# Patient Record
Sex: FEMALE | Race: WHITE | NOT HISPANIC OR LATINO | Employment: UNEMPLOYED | URBAN - NONMETROPOLITAN AREA
[De-identification: names, ages, dates, MRNs, and addresses within clinical notes are randomized per-mention and may not be internally consistent; named-entity substitution may affect disease eponyms.]

---
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
## 2023-09-20 ENCOUNTER — OFFICE VISIT (OUTPATIENT)
Dept: OTOLARYNGOLOGY | Facility: OTHER | Age: 23
End: 2023-09-20
Attending: NURSE PRACTITIONER
Payer: COMMERCIAL

## 2023-09-20 DIAGNOSIS — H92.12 OTORRHEA, LEFT: Primary | ICD-10-CM

## 2023-09-20 LAB
KOH PREPARATION: NORMAL
KOH PREPARATION: NORMAL

## 2023-09-20 PROCEDURE — 87210 SMEAR WET MOUNT SALINE/INK: CPT | Performed by: NURSE PRACTITIONER

## 2023-09-20 PROCEDURE — 99203 OFFICE O/P NEW LOW 30 MIN: CPT | Mod: 25 | Performed by: NURSE PRACTITIONER

## 2023-09-20 PROCEDURE — 92504 EAR MICROSCOPY EXAMINATION: CPT | Performed by: NURSE PRACTITIONER

## 2023-09-20 NOTE — PATIENT INSTRUCTIONS
Thank you for allowing Suzie Hugo and our ENT team to participate in your care.  If your medications are too expensive, please give the nurse a call.  We can possibly change this medication.  If you have a scheduling or an appointment question please contact our Health Unit Coordinator at their direct line [phone].   ALL nursing questions or concerns can be directed to your ENT nurse at: [phone] - Idn     Ear Culture taken, nurse will call you with results.

## 2023-09-20 NOTE — PROGRESS NOTES
"Otolaryngology Note         Chief Complaint:     Patient presents with:  Otitis Media           History of Present Illness:     Peace Levi is a 23 year old female seen today for left sided otorrhea.      [place] for the last [place] visiting.  She is scheduled to fly back to the [place] tomorrow.  She was seen in the ENT in the [place] and the ear was suction debrided.  She was treated with otics.  She does not recall the name of the drops.  She is able to see her ENT provider in the [place] when she gets home.    She was also on amoxicillin.  No culture was completed    No history of ear surgery.    She does have a history of OE  They did talk about tubes but no tubes  She feels muffled on the left with ringing.      Hearing test about a year ago and normal.         Medications:     Current Outpatient Rx   Medication Sig Dispense Refill    ALBUTEROL 108 (90 BASE) MCG/ACT inhaler INHALE TWO PUFFS BY MOUTH 4 TIMES DAILY 9 each 0    hydrOXYzine (ATARAX) 50 MG tablet Take 0.5-1 tablets (25-50 mg) by mouth every 6 hours as needed for itching 30 tablet 0    IBUPROFEN PO Take 600 mg by mouth      Multiple Vitamins-Minerals (OPTIVITE P.M.T.) TABS Take 1 tablet  tablet 3    SUMAtriptan (IMITREX) 25 MG tablet Take 1-2 tablets (25-50 mg) by mouth at onset of headache for migraine May repeat in 2 hours. Max 8 tablets/24 hours. 18 tablet 1            Allergies:     Allergies: Augmented betamethasone diprop [betamethasone] and Dextrose-fructose-sod citrate          Past Medical History:     Past Medical History:   Diagnosis Date    Dysmenorrhea     Migraines     Mood disorder (H)             Past Surgical History:     Past Surgical History:   Procedure Laterality Date    HIP SURGERY      at age 5 to correct \"pigeon toe\"       ENT family history reviewed         Social History:     Social History     Tobacco Use    Smoking status: Never    Smokeless tobacco: Never   Substance Use Topics    Alcohol " use: No     Alcohol/week: 0.0 standard drinks of alcohol    Drug use: No            Review of Systems:     ROS: See HPI         Physical Exam:     There were no vitals taken for this visit.  General - The patient is well nourished and well developed, and appears to have good nutritional status.  Alert and oriented to person and place, answers questions and cooperates with examination appropriately.   Head and Face - Normocephalic and atraumatic, with no gross asymmetry noted.  The facial nerve is intact, with strong symmetric movements.  Voice and Breathing - The patient was breathing comfortably without the use of accessory muscles. There was no wheezing, stridor. The patients voice was clear and strong, and had appropriate pitch and quality.  Ears - External ear normal.  The ears were examined under binocular microscopy and with otoscope.  The left canal has scant otorrhea.  I did not culture as she is returning to the  tomorrow.  However I did do a KOH prep to assure no fungal elements.  No fungal elements found.  The left canal was suction debrided with #7, #5, #3 Aaron.  The tympanic membrane is intact with generalized myringitis.  The canal has generalized edema.  The ear was treated with sulfacetamide/prednisone powder via aerolizer.  The right canal is patent, right tympanic membrane is intact without effusion or retraction.  Eyes - Extraocular movements intact, sclera were not icteric or injected.  Mouth - Examination of the oral cavity showed pink, healthy oral mucosa. Dentition in good condition. No lesions or ulcerations noted. The tongue was mobile and midline.   Throat - The walls of the oropharynx were smooth, pink, moist, symmetric, and had no lesions or ulcerations.  The tonsillar pillars and soft palate were symmetric. The uvula was midline on elevation.    Neck - Normal range of motion, no palpable lymphadenopathy.  Palpation of the thyroid was soft and smooth, with no nodules or goiter  appreciated.  The trachea was mobile and midline.  Nose - External contour is symmetric, no gross deflection or scars.  Nasal mucosa is pink and moist with no abnormal mucus.  The septum and turbinates were evaluated with nasal speculum, no polyps, masses, or purulence noted on examination.         Assessment and Plan:       ICD-10-CM    1. Otorrhea, left  H92.12 KOH Preparation     KOH Preparation     CANCELED: KOH prep (Other than skin, nails, hair)        The ear was treated with sulfa Pred powder  Reassured tympanic membrane is intact  Follow-up with ENT provider when you return to the .    Suzie Hugo NP-C  [facility]